# Patient Record
Sex: FEMALE | Race: OTHER
[De-identification: names, ages, dates, MRNs, and addresses within clinical notes are randomized per-mention and may not be internally consistent; named-entity substitution may affect disease eponyms.]

---

## 2017-05-19 ENCOUNTER — HOSPITAL ENCOUNTER (OUTPATIENT)
Dept: HOSPITAL 53 - M SFHCADAM | Age: 67
End: 2017-05-19
Attending: FAMILY MEDICINE
Payer: MEDICARE

## 2017-05-19 DIAGNOSIS — R73.01: Primary | ICD-10-CM

## 2017-05-19 DIAGNOSIS — Z79.899: ICD-10-CM

## 2017-05-19 DIAGNOSIS — E66.01: ICD-10-CM

## 2017-05-19 LAB
ALBUMIN SERPL BCG-MCNC: 3.4 GM/DL (ref 3.2–5.2)
ALBUMIN/GLOB SERPL: 0.85 {RATIO} (ref 1–1.93)
ALP SERPL-CCNC: 70 U/L (ref 45–117)
ALT SERPL W P-5'-P-CCNC: 25 U/L (ref 12–78)
ANION GAP SERPL CALC-SCNC: 9 MEQ/L (ref 8–16)
AST SERPL-CCNC: 18 U/L (ref 15–37)
BILIRUB SERPL-MCNC: 0.3 MG/DL (ref 0.2–1)
BUN SERPL-MCNC: 14 MG/DL (ref 7–18)
CALCIUM SERPL-MCNC: 8.6 MG/DL (ref 8.8–10.2)
CHLORIDE SERPL-SCNC: 105 MEQ/L (ref 98–107)
CHOLEST SERPL-MCNC: 195 MG/DL (ref ?–200)
CO2 SERPL-SCNC: 26 MEQ/L (ref 21–32)
CREAT SERPL-MCNC: 0.87 MG/DL (ref 0.55–1.02)
EST. AVERAGE GLUCOSE BLD GHB EST-MCNC: 177 MG/DL (ref 60–110)
GFR SERPL CREATININE-BSD FRML MDRD: > 60 ML/MIN/{1.73_M2} (ref 45–?)
GLUCOSE SERPL-MCNC: 180 MG/DL (ref 80–110)
POTASSIUM SERPL-SCNC: 4 MEQ/L (ref 3.5–5.1)
PROT SERPL-MCNC: 7.4 GM/DL (ref 6.4–8.2)
SODIUM SERPL-SCNC: 140 MEQ/L (ref 136–145)
T4 FREE SERPL-MCNC: 1.11 NG/DL (ref 0.76–1.46)
TRIGL SERPL-MCNC: 175 MG/DL (ref ?–150)

## 2017-06-22 ENCOUNTER — HOSPITAL ENCOUNTER (OUTPATIENT)
Dept: HOSPITAL 53 - M RAD | Age: 67
End: 2017-06-22
Payer: MEDICARE

## 2017-06-22 DIAGNOSIS — K43.2: Primary | ICD-10-CM

## 2017-06-22 PROCEDURE — 74177 CT ABD & PELVIS W/CONTRAST: CPT

## 2017-06-22 NOTE — REP
CT of the abdomen and pelvis with IV and bowel contrast:

 

There is a ventral hernia slightly to the left of midline adjacent to the

umbilicus in the medial portion of the left rectus abdominus muscle.  The

peritoneal defect measures 5.5 cm transversely by 3.4 cm craniocaudad.  The

hernia sac measures 8.6 centers centimeters transversely by seven 0.5 cm

craniocaudad.  The hernia contains omental fat.  There is no bowel within the

hernia sac.

 

The visualized lung fields are unremarkable.

 

The hepatic parenchyma is diffusely less dense than the spleen compatible with

hepato steatosis.  There are no focal hepatic masses.

 

The gallbladder, pancreas and spleen are unremarkable.

 

The adrenals and kidneys are unremarkable.  The abdominal aorta is unremarkable.

 

The bowel and mesentery are unremarkable except for the above described ventral

hernia.

 

Pelvis:

 

There is a large abdominal pannus hanging inferiorly over the pelvis containing

multiple bowel loops and mesentery.  The above described ventral hernia arises

along the anterior margin of this pannus.  This pannus represents a larger

abdominal hernia with the peritoneal defect measuring 7.8 cm transversely by 11

cm craniocaudad.  The hernia sac measures 20 cm transversely by 11 cm

craniocaudad.

 

In addition, there is a another hernia along the left lateral margin of the panus

containing omentum and bowel loops with the peritoneal defect measuring 5.7 cm AP

by by 11.8 cm craniocaudad.

 

There is no evidence of bowel distension or obstruction.  No bowel wall

thickening to suggest strangulation.  There is no ascites.

 

The bladder is unremarkable.

 

Impression:

 

There are three ventral hernias having a complex relationship in the anterior

pelvic wall.

 

There is a large pannus hanging inferiorly over the anterior margin of the

pelvis.  This pannus is a ventral hernia.  There are two hernias arising from

this pannus one anteriorly slightly to the left of midline of the level that the

level of the umbilicus as described.  The other is along the left lateral margin

of this pannus. All three hernias  contain omentum and bowel.

 

There is no evidence of bowel obstruction or strangulation.  There is no

ascites.

 

There is hepato steatosis.

 

 

 

 

Signed by

Conner Gaitan MD 06/22/2017 11:03 A

## 2017-06-24 ENCOUNTER — HOSPITAL ENCOUNTER (OUTPATIENT)
Dept: HOSPITAL 53 - M ADAMS | Age: 67
End: 2017-06-24
Attending: NURSE PRACTITIONER
Payer: MEDICARE

## 2017-06-24 DIAGNOSIS — Z01.89: Primary | ICD-10-CM

## 2017-06-24 LAB
ANION GAP SERPL CALC-SCNC: 10 MEQ/L (ref 8–16)
BUN SERPL-MCNC: 15 MG/DL (ref 7–18)
CALCIUM SERPL-MCNC: 9.2 MG/DL (ref 8.8–10.2)
CHLORIDE SERPL-SCNC: 104 MEQ/L (ref 98–107)
CO2 SERPL-SCNC: 28 MEQ/L (ref 21–32)
CREAT SERPL-MCNC: 0.8 MG/DL (ref 0.55–1.02)
GFR SERPL CREATININE-BSD FRML MDRD: > 60 ML/MIN/{1.73_M2} (ref 45–?)
GLUCOSE SERPL-MCNC: 146 MG/DL (ref 80–110)
POTASSIUM SERPL-SCNC: 4.1 MEQ/L (ref 3.5–5.1)
SODIUM SERPL-SCNC: 142 MEQ/L (ref 136–145)

## 2017-08-21 ENCOUNTER — HOSPITAL ENCOUNTER (OUTPATIENT)
Dept: HOSPITAL 53 - M SFHCADAM | Age: 67
End: 2017-08-21
Attending: FAMILY MEDICINE
Payer: MEDICARE

## 2017-08-21 DIAGNOSIS — E11.69: Primary | ICD-10-CM

## 2017-08-21 LAB — EST. AVERAGE GLUCOSE BLD GHB EST-MCNC: 163 MG/DL (ref 60–110)

## 2017-08-25 ENCOUNTER — HOSPITAL ENCOUNTER (OUTPATIENT)
Dept: HOSPITAL 53 - M SFHCADAM | Age: 67
End: 2017-08-25
Attending: FAMILY MEDICINE
Payer: MEDICARE

## 2017-08-25 DIAGNOSIS — E11.69: Primary | ICD-10-CM

## 2017-08-25 PROCEDURE — 82043 UR ALBUMIN QUANTITATIVE: CPT

## 2017-12-04 ENCOUNTER — HOSPITAL ENCOUNTER (OUTPATIENT)
Dept: HOSPITAL 53 - M SFHCADAM | Age: 67
End: 2017-12-04
Attending: FAMILY MEDICINE
Payer: MEDICARE

## 2017-12-04 DIAGNOSIS — E11.69: Primary | ICD-10-CM

## 2017-12-04 LAB — EST. AVERAGE GLUCOSE BLD GHB EST-MCNC: 143 MG/DL (ref 60–110)

## 2017-12-04 PROCEDURE — 82043 UR ALBUMIN QUANTITATIVE: CPT

## 2017-12-04 PROCEDURE — 83036 HEMOGLOBIN GLYCOSYLATED A1C: CPT

## 2018-03-14 ENCOUNTER — HOSPITAL ENCOUNTER (OUTPATIENT)
Dept: HOSPITAL 53 - M SFHCADAM | Age: 68
End: 2018-03-14
Attending: FAMILY MEDICINE
Payer: MEDICARE

## 2018-03-14 DIAGNOSIS — E11.69: Primary | ICD-10-CM

## 2018-03-14 LAB — EST. AVERAGE GLUCOSE BLD GHB EST-MCNC: 143 MG/DL (ref 60–110)

## 2018-03-14 PROCEDURE — 83036 HEMOGLOBIN GLYCOSYLATED A1C: CPT

## 2018-03-15 ENCOUNTER — HOSPITAL ENCOUNTER (OUTPATIENT)
Dept: HOSPITAL 53 - M SFHCADAM | Age: 68
End: 2018-03-15
Attending: FAMILY MEDICINE
Payer: MEDICARE

## 2018-03-15 DIAGNOSIS — J00: Primary | ICD-10-CM

## 2018-03-15 DIAGNOSIS — E11.69: ICD-10-CM

## 2018-03-15 LAB
ALBUMIN/GLOBULIN RATIO: 1.03 (ref 1–1.93)
ALBUMIN: 3.8 GM/DL (ref 3.2–5.2)
ALKALINE PHOSPHATASE: 89 U/L (ref 45–117)
ALT SERPL W P-5'-P-CCNC: 28 U/L (ref 12–78)
ANION GAP: 9 MEQ/L (ref 8–16)
AST SERPL-CCNC: 21 U/L (ref 7–37)
BASO #: 0.1 10^3/UL (ref 0–0.2)
BASO %: 0.6 % (ref 0–1)
BILIRUBIN,TOTAL: 0.3 MG/DL (ref 0.2–1)
BLOOD UREA NITROGEN: 16 MG/DL (ref 7–18)
CALCIUM LEVEL: 9 MG/DL (ref 8.8–10.2)
CARBON DIOXIDE LEVEL: 27 MEQ/L (ref 21–32)
CHLORIDE LEVEL: 104 MEQ/L (ref 98–107)
CREATININE FOR GFR: 0.78 MG/DL (ref 0.55–1.3)
EOS #: 0.3 10^3/UL (ref 0–0.5)
EOSINOPHIL NFR BLD AUTO: 2.4 % (ref 0–3)
FREE T4: 0.92 NG/DL (ref 0.76–1.46)
GFR SERPL CREATININE-BSD FRML MDRD: > 60 ML/MIN/{1.73_M2} (ref 45–?)
GLUCOSE, FASTING: 98 MG/DL (ref 70–100)
HEMATOCRIT: 40.4 % (ref 36–47)
HEMOGLOBIN: 13 G/DL (ref 12–16)
IMMATURE GRANULOCYTE %: 0.5 % (ref 0–3)
LYMPH #: 3.5 10^3/UL (ref 1.5–4.5)
LYMPH %: 28 % (ref 24–44)
MEAN CORPUSCULAR HEMOGLOBIN: 27.4 PG (ref 27–33)
MEAN CORPUSCULAR HGB CONC: 32.2 G/DL (ref 32–36.5)
MEAN CORPUSCULAR VOLUME: 85.1 FL (ref 80–96)
MONO #: 1 10^3/UL (ref 0–0.8)
MONO %: 8.4 % (ref 0–5)
NEUTROPHILS #: 7.4 10^3/UL (ref 1.8–7.7)
NEUTROPHILS %: 60.1 % (ref 36–66)
NRBC BLD AUTO-RTO: 0 % (ref 0–0)
PLATELET COUNT, AUTOMATED: 443 10^3/UL (ref 150–450)
POTASSIUM SERUM: 4.5 MEQ/L (ref 3.5–5.1)
RED BLOOD COUNT: 4.75 10^6/UL (ref 4–5.4)
RED CELL DISTRIBUTION WIDTH: 13.7 % (ref 11.5–14.5)
SODIUM LEVEL: 140 MEQ/L (ref 136–145)
THYROID STIMULATING HORMONE: 1.47 UIU/ML (ref 0.36–3.74)
TOTAL PROTEIN: 7.5 GM/DL (ref 6.4–8.2)
WHITE BLOOD COUNT: 12.3 10^3/UL (ref 4–10)

## 2018-03-15 PROCEDURE — 84443 ASSAY THYROID STIM HORMONE: CPT

## 2018-04-02 ENCOUNTER — HOSPITAL ENCOUNTER (OUTPATIENT)
Dept: HOSPITAL 53 - M LAB REF | Age: 68
End: 2018-04-02
Attending: INTERNAL MEDICINE
Payer: MEDICARE

## 2018-04-02 DIAGNOSIS — R80.9: Primary | ICD-10-CM

## 2018-04-02 LAB — TOTAL PROTEIN,RANDOM URINE: 11.6 MG/DL (ref 0–12)

## 2018-04-02 PROCEDURE — 82570 ASSAY OF URINE CREATININE: CPT

## 2018-04-03 LAB
APPEARANCE, URINE: CLEAR
BACTERIA UR QL AUTO: (no result)
BILIRUBIN, URINE AUTO: NEGATIVE
BLOOD, URINE BLOOD: (no result)
CREATININE,RANDOM URINE: 24 MG/DL
GLUCOSE, URINE (UA) AUTO: NEGATIVE MG/DL
KETONE, URINE AUTO: NEGATIVE MG/DL
LEUKOCYTE ESTERASE UR QL STRIP.AUTO: (no result)
MUCUS, URINE: (no result)
NITRITE, URINE AUTO: NEGATIVE
PH,URINE: 5 UNITS (ref 5–9)
PROT UR QL STRIP.AUTO: NEGATIVE MG/DL
RBC, URINE AUTO: 1 /HPF (ref 0–3)
SPECIFIC GRAVITY URINE AUTO: 1.01 (ref 1–1.03)
SQUAMOUS #/AREA URNS AUTO: 0 /HPF (ref 0–6)
UROBILINOGEN, URINE AUTO: 0.2 MG/DL (ref 0–2)
WBC, URINE AUTO: 15 /HPF (ref 0–3)

## 2018-06-06 ENCOUNTER — HOSPITAL ENCOUNTER (OUTPATIENT)
Dept: HOSPITAL 53 - M LAB REF | Age: 68
End: 2018-06-06
Attending: INTERNAL MEDICINE
Payer: MEDICARE

## 2018-06-06 DIAGNOSIS — R31.29: ICD-10-CM

## 2018-06-06 DIAGNOSIS — R80.9: Primary | ICD-10-CM

## 2018-06-06 LAB
BACTERIA UR QL AUTO: (no result)
MUCUS, URINE: (no result)
RBC, URINE AUTO: 6 /HPF (ref 0–3)
SQUAMOUS #/AREA URNS AUTO: 0 /HPF (ref 0–6)
TOTAL PROTEIN,RANDOM URINE: 40.1 MG/DL (ref 0–12)
WBC, URINE AUTO: 47 /HPF (ref 0–3)

## 2018-06-06 PROCEDURE — 84156 ASSAY OF PROTEIN URINE: CPT

## 2018-06-12 LAB
C-ANCA TITR SER IF: (no result) TITER
P-ANCA ATYPICAL TITR SER IF: (no result) TITER
P-ANCA TITR SER IF: (no result) TITER

## 2018-07-18 ENCOUNTER — HOSPITAL ENCOUNTER (OUTPATIENT)
Dept: HOSPITAL 53 - M SFHCADAM | Age: 68
End: 2018-07-18
Attending: FAMILY MEDICINE
Payer: MEDICARE

## 2018-07-18 DIAGNOSIS — E11.69: Primary | ICD-10-CM

## 2018-07-18 LAB — EST. AVERAGE GLUCOSE BLD GHB EST-MCNC: 140 MG/DL (ref 60–110)

## 2018-07-18 PROCEDURE — 83036 HEMOGLOBIN GLYCOSYLATED A1C: CPT

## 2018-08-13 ENCOUNTER — HOSPITAL ENCOUNTER (OUTPATIENT)
Dept: HOSPITAL 53 - M OPP | Age: 68
Discharge: HOME | End: 2018-08-13
Attending: INTERNAL MEDICINE
Payer: MEDICARE

## 2018-08-13 DIAGNOSIS — Z84.89: ICD-10-CM

## 2018-08-13 DIAGNOSIS — Z12.11: Primary | ICD-10-CM

## 2018-08-13 DIAGNOSIS — Z85.42: ICD-10-CM

## 2018-08-13 DIAGNOSIS — Z79.899: ICD-10-CM

## 2018-08-13 DIAGNOSIS — Z79.82: ICD-10-CM

## 2018-08-13 DIAGNOSIS — Z86.010: ICD-10-CM

## 2018-08-13 DIAGNOSIS — Z88.5: ICD-10-CM

## 2018-08-13 DIAGNOSIS — E78.5: ICD-10-CM

## 2018-08-13 DIAGNOSIS — R06.02: ICD-10-CM

## 2018-08-13 DIAGNOSIS — Z80.3: ICD-10-CM

## 2018-08-13 DIAGNOSIS — Z91.040: ICD-10-CM

## 2018-08-13 DIAGNOSIS — E11.9: ICD-10-CM

## 2018-08-13 DIAGNOSIS — Z88.0: ICD-10-CM

## 2018-08-13 DIAGNOSIS — I10: ICD-10-CM

## 2018-08-13 DIAGNOSIS — F32.9: ICD-10-CM

## 2018-08-13 DIAGNOSIS — G43.909: ICD-10-CM

## 2018-08-13 DIAGNOSIS — Z88.8: ICD-10-CM

## 2018-08-13 DIAGNOSIS — Z79.84: ICD-10-CM

## 2018-08-13 RX ADMIN — SODIUM CHLORIDE 1 MLS/HR: 9 INJECTION, SOLUTION INTRAVENOUS at 11:15

## 2018-10-08 ENCOUNTER — HOSPITAL ENCOUNTER (OUTPATIENT)
Dept: HOSPITAL 53 - M SFHCADAM | Age: 68
End: 2018-10-08
Attending: FAMILY MEDICINE
Payer: MEDICARE

## 2018-10-08 DIAGNOSIS — E11.69: Primary | ICD-10-CM

## 2018-10-08 LAB — EST. AVERAGE GLUCOSE BLD GHB EST-MCNC: 131 MG/DL (ref 60–110)

## 2018-10-08 PROCEDURE — 83036 HEMOGLOBIN GLYCOSYLATED A1C: CPT

## 2018-10-09 ENCOUNTER — HOSPITAL ENCOUNTER (OUTPATIENT)
Dept: HOSPITAL 53 - M LAB REF | Age: 68
End: 2018-10-09
Attending: INTERNAL MEDICINE
Payer: MEDICARE

## 2018-10-09 DIAGNOSIS — R31.29: ICD-10-CM

## 2018-10-09 DIAGNOSIS — R80.9: Primary | ICD-10-CM

## 2018-10-09 LAB
COMPLEMENT C3: 142 MG/DL (ref 90–180)
COMPLEMENT C4: 28 MG/DL (ref 10–40)
TOTAL PROTEIN,RANDOM URINE: 29.9 MG/DL (ref 0–12)
TOTAL PROTEIN: 7.1 GM/DL (ref 6.4–8.2)
URINE TOTAL PROTEIN: 29.9 MG/DL (ref 0–12)

## 2018-10-09 PROCEDURE — 84165 PROTEIN E-PHORESIS SERUM: CPT

## 2018-10-11 LAB
ALBUMIN %: 54.3 % (ref 55.8–66.1)
ALBUMIN: 3.86 GM/DL (ref 3.29–5.55)
ALPHA-1-GLOBULIN %: 4.4 % (ref 2.9–4.9)
ALPHA-1-GLOBULINS: 0.31 GM/DL (ref 0.17–0.41)
ALPHA-2-GLOBULINS %: 10.8 % (ref 7.1–11.8)
ALPHA-2-GLOBULINS: 0.77 GM/DL (ref 0.42–0.99)
B-GLOBULIN SERPL ELPH-MCNC: 0.58 GM/DL (ref 0.28–0.6)
BETA1 GLOB MFR SERPL ELPH: 8.1 % (ref 4.7–7.2)
BETA2 GLOB MFR SERPL ELPH: 8.1 % (ref 3.2–6.5)
BETA2 GLOB SERPL ELPH-MCNC: 0.58 GM/DL (ref 0.19–0.55)
GAMMA GLOBULIN %: 14.3 % (ref 11.1–18.8)
GAMMA GLOBULINS: 1.02 GM/DL (ref 0.65–1.58)
IT SERUM INTERPRETATION: (no result)
IT URINE INTERPRETATION: (no result)
SPECIMEN VOL 24H UR: (no result) ML
SPEP INTERPRETATION FOR RFX: (no result)
UPEP INTERPRETATION: (no result)

## 2018-10-12 LAB
ANTI DOUBLE STRAND-DNA AB: 1 IU/ML (ref 0–9)
KAPPA LC FREE SER-MCNC: 19.4 MG/L (ref 3.3–19.4)
KAPPA LC/LAMBDA SER: 1.07 {RATIO} (ref 0.26–1.65)
LAMBDA LC FREE SERPL-MCNC: 18.2 MG/L (ref 5.7–26.3)

## 2019-01-24 ENCOUNTER — HOSPITAL ENCOUNTER (OUTPATIENT)
Dept: HOSPITAL 53 - M SFHCADAM | Age: 69
End: 2019-01-24
Attending: FAMILY MEDICINE
Payer: MEDICARE

## 2019-01-24 DIAGNOSIS — E11.69: Primary | ICD-10-CM

## 2019-01-24 LAB
ALBUMIN SERPL BCG-MCNC: 3.6 GM/DL (ref 3.2–5.2)
ALT SERPL W P-5'-P-CCNC: 26 U/L (ref 12–78)
BILIRUB SERPL-MCNC: 0.3 MG/DL (ref 0.2–1)
BUN SERPL-MCNC: 15 MG/DL (ref 7–18)
CALCIUM SERPL-MCNC: 8.8 MG/DL (ref 8.8–10.2)
CHLORIDE SERPL-SCNC: 103 MEQ/L (ref 98–107)
CHOLEST SERPL-MCNC: 157 MG/DL (ref ?–200)
CHOLEST/HDLC SERPL: 2.85 {RATIO} (ref ?–5)
CO2 SERPL-SCNC: 29 MEQ/L (ref 21–32)
CREAT SERPL-MCNC: 0.81 MG/DL (ref 0.55–1.3)
EST. AVERAGE GLUCOSE BLD GHB EST-MCNC: 154 MG/DL (ref 60–110)
GFR SERPL CREATININE-BSD FRML MDRD: > 60 ML/MIN/{1.73_M2} (ref 45–?)
GLUCOSE SERPL-MCNC: 124 MG/DL (ref 70–100)
HDLC SERPL-MCNC: 55 MG/DL (ref 40–?)
LDLC SERPL CALC-MCNC: 61 MG/DL (ref ?–100)
NONHDLC SERPL-MCNC: 102 MG/DL
POTASSIUM SERPL-SCNC: 4.4 MEQ/L (ref 3.5–5.1)
PROT SERPL-MCNC: 7.2 GM/DL (ref 6.4–8.2)
SODIUM SERPL-SCNC: 138 MEQ/L (ref 136–145)
TRIGL SERPL-MCNC: 204 MG/DL (ref ?–150)

## 2019-01-25 ENCOUNTER — HOSPITAL ENCOUNTER (OUTPATIENT)
Dept: HOSPITAL 53 - M SFHCADAM | Age: 69
End: 2019-01-25
Attending: FAMILY MEDICINE
Payer: MEDICARE

## 2019-01-25 DIAGNOSIS — E11.69: Primary | ICD-10-CM

## 2019-01-25 LAB
CREAT UR-MCNC: 50 MG/DL
MICROALBUMIN UR-MCNC: 80.2 MG/L
MICROALBUMIN/CREAT UR: 160.4 MCG/MG (ref 0–30)

## 2019-01-25 PROCEDURE — 82043 UR ALBUMIN QUANTITATIVE: CPT

## 2019-04-04 ENCOUNTER — HOSPITAL ENCOUNTER (OUTPATIENT)
Dept: HOSPITAL 53 - M LAB REF | Age: 69
End: 2019-04-04
Attending: INTERNAL MEDICINE
Payer: MEDICARE

## 2019-04-04 DIAGNOSIS — R80.9: Primary | ICD-10-CM

## 2019-04-17 ENCOUNTER — HOSPITAL ENCOUNTER (OUTPATIENT)
Dept: HOSPITAL 53 - M SFHCADAM | Age: 69
End: 2019-04-17
Attending: FAMILY MEDICINE
Payer: MEDICARE

## 2019-04-17 DIAGNOSIS — E11.69: Primary | ICD-10-CM

## 2019-04-17 LAB
CREAT UR-MCNC: 82.9 MG/DL
EST. AVERAGE GLUCOSE BLD GHB EST-MCNC: 160 MG/DL (ref 60–110)
MICROALBUMIN UR-MCNC: 231 MG/L
MICROALBUMIN/CREAT UR: 278.6 MCG/MG (ref 0–30)

## 2022-02-11 ENCOUNTER — HOSPITAL ENCOUNTER (OUTPATIENT)
Dept: HOSPITAL 53 - M LAB REF | Age: 72
End: 2022-02-11
Attending: INTERNAL MEDICINE
Payer: COMMERCIAL

## 2022-02-11 DIAGNOSIS — N76.0: Primary | ICD-10-CM

## 2024-10-21 ENCOUNTER — HOSPITAL ENCOUNTER (OUTPATIENT)
Dept: HOSPITAL 53 - M WHC | Age: 74
End: 2024-10-21
Attending: INTERNAL MEDICINE
Payer: MEDICARE

## 2024-10-21 DIAGNOSIS — D48.7: Primary | ICD-10-CM

## 2024-10-28 ENCOUNTER — HOSPITAL ENCOUNTER (OUTPATIENT)
Dept: HOSPITAL 53 - M LAB REF | Age: 74
End: 2024-10-28
Attending: SURGERY
Payer: MEDICARE

## 2024-10-28 DIAGNOSIS — D17.22: Primary | ICD-10-CM

## 2024-11-26 ENCOUNTER — HOSPITAL ENCOUNTER (OUTPATIENT)
Dept: HOSPITAL 53 - M PLAIMG | Age: 74
End: 2024-11-26
Attending: INTERNAL MEDICINE
Payer: MEDICARE

## 2024-11-26 DIAGNOSIS — Z15.01: Primary | ICD-10-CM

## 2024-12-03 ENCOUNTER — HOSPITAL ENCOUNTER (OUTPATIENT)
Dept: HOSPITAL 53 - M LABDRWAD | Age: 74
End: 2024-12-03
Attending: INTERNAL MEDICINE
Payer: MEDICARE

## 2024-12-03 DIAGNOSIS — I10: Primary | ICD-10-CM

## 2024-12-03 LAB
CHOLEST SERPL-MCNC: 139 MG/DL (ref ?–200)
CHOLEST/HDLC SERPL: 2.67 {RATIO} (ref ?–5)
HDLC SERPL-MCNC: 51.9 MG/DL (ref 40–?)
LDLC SERPL CALC-MCNC: 60.7 MG/DL (ref ?–100)
NONHDLC SERPL-MCNC: 87.1 MG/DL
TRIGL SERPL-MCNC: 132 MG/DL (ref ?–150)

## 2025-02-06 ENCOUNTER — HOSPITAL ENCOUNTER (OUTPATIENT)
Dept: HOSPITAL 53 - M OPP | Age: 75
Discharge: HOME | End: 2025-02-06
Attending: INTERNAL MEDICINE
Payer: MEDICARE

## 2025-02-06 VITALS — OXYGEN SATURATION: 97 % | DIASTOLIC BLOOD PRESSURE: 63 MMHG | SYSTOLIC BLOOD PRESSURE: 127 MMHG

## 2025-02-06 VITALS — HEIGHT: 67 IN | BODY MASS INDEX: 36.26 KG/M2 | WEIGHT: 231 LBS

## 2025-02-06 VITALS — TEMPERATURE: 97 F

## 2025-02-06 DIAGNOSIS — Z79.85: ICD-10-CM

## 2025-02-06 DIAGNOSIS — K64.8: ICD-10-CM

## 2025-02-06 DIAGNOSIS — D12.0: ICD-10-CM

## 2025-02-06 DIAGNOSIS — Z88.1: ICD-10-CM

## 2025-02-06 DIAGNOSIS — D12.4: ICD-10-CM

## 2025-02-06 DIAGNOSIS — Z79.899: ICD-10-CM

## 2025-02-06 DIAGNOSIS — Z86.0100: ICD-10-CM

## 2025-02-06 DIAGNOSIS — K62.1: Primary | ICD-10-CM

## 2025-02-06 DIAGNOSIS — K63.89: ICD-10-CM

## 2025-02-06 DIAGNOSIS — Z91.040: ICD-10-CM

## 2025-02-06 DIAGNOSIS — Z79.84: ICD-10-CM

## 2025-02-06 DIAGNOSIS — Z88.8: ICD-10-CM

## 2025-02-06 DIAGNOSIS — K57.30: ICD-10-CM

## 2025-02-06 PROCEDURE — 88305 TISSUE EXAM BY PATHOLOGIST: CPT

## 2025-02-06 PROCEDURE — 45385 COLONOSCOPY W/LESION REMOVAL: CPT

## 2025-02-06 PROCEDURE — 45380 COLONOSCOPY AND BIOPSY: CPT

## 2025-02-24 ENCOUNTER — HOSPITAL ENCOUNTER (OUTPATIENT)
Dept: HOSPITAL 53 - M RAD | Age: 75
End: 2025-02-24
Attending: PODIATRIST
Payer: MEDICARE

## 2025-02-24 DIAGNOSIS — I73.89: Primary | ICD-10-CM

## 2025-02-24 DIAGNOSIS — I70.293: ICD-10-CM
